# Patient Record
Sex: MALE | Race: ASIAN | NOT HISPANIC OR LATINO
[De-identification: names, ages, dates, MRNs, and addresses within clinical notes are randomized per-mention and may not be internally consistent; named-entity substitution may affect disease eponyms.]

---

## 2019-03-25 ENCOUNTER — NON-APPOINTMENT (OUTPATIENT)
Age: 34
End: 2019-03-25

## 2019-03-25 ENCOUNTER — APPOINTMENT (OUTPATIENT)
Dept: CARDIOLOGY | Facility: CLINIC | Age: 34
End: 2019-03-25
Payer: MEDICAID

## 2019-03-25 VITALS
TEMPERATURE: 98 F | DIASTOLIC BLOOD PRESSURE: 72 MMHG | RESPIRATION RATE: 17 BRPM | HEART RATE: 66 BPM | SYSTOLIC BLOOD PRESSURE: 110 MMHG | WEIGHT: 181 LBS | HEIGHT: 69 IN | OXYGEN SATURATION: 97 % | BODY MASS INDEX: 26.81 KG/M2

## 2019-03-25 PROCEDURE — 93000 ELECTROCARDIOGRAM COMPLETE: CPT

## 2019-03-25 PROCEDURE — 99214 OFFICE O/P EST MOD 30 MIN: CPT

## 2019-04-04 ENCOUNTER — MESSAGE (OUTPATIENT)
Age: 34
End: 2019-04-04

## 2020-11-17 ENCOUNTER — APPOINTMENT (OUTPATIENT)
Dept: UROLOGY | Facility: CLINIC | Age: 35
End: 2020-11-17
Payer: MEDICAID

## 2020-11-17 VITALS
OXYGEN SATURATION: 95 % | RESPIRATION RATE: 17 BRPM | DIASTOLIC BLOOD PRESSURE: 67 MMHG | BODY MASS INDEX: 27.55 KG/M2 | HEIGHT: 69 IN | TEMPERATURE: 98 F | WEIGHT: 186 LBS | HEART RATE: 100 BPM | SYSTOLIC BLOOD PRESSURE: 115 MMHG

## 2020-11-17 DIAGNOSIS — R94.31 ABNORMAL ELECTROCARDIOGRAM [ECG] [EKG]: ICD-10-CM

## 2020-11-17 DIAGNOSIS — R07.9 CHEST PAIN, UNSPECIFIED: ICD-10-CM

## 2020-11-17 DIAGNOSIS — N47.5 ADHESIONS OF PREPUCE AND GLANS PENIS: ICD-10-CM

## 2020-11-17 PROCEDURE — 99204 OFFICE O/P NEW MOD 45 MIN: CPT

## 2020-11-17 NOTE — LETTER BODY
[FreeTextEntry1] : Chris Hall MD\par [address], [address]\[place] [address]\par [phone]\par \par Dear Dr. Hall,\par \par Reason for visit: Redundant prepuce. Premature ejaculation.\par \par This is a 35 year-old gentleman with history of chest pain, presenting with redundant prepuce. Patient is here for evaluation for circumcision. He recently saw Dr. Chaz Echeverria, who prescribed him lidocaine cream and a topical steroid for his foreskin. Patient has difficulty maintaining hygiene. He is unable to retract his foreskin. He has balanitis. The patient experiences pain during sexual intercourse. He denies any hematuria or urinary incontinence. He also reports premature ejaculation. All other review of systems are negative. He has no cancer in his family medical history. He has no previous surgical history. Past medical history, family history and social history were inquired and were noncontributory to current condition. The patient does not use tobacco or drink alcohol. Medications and allergies were reviewed. He has no known allergies to medication. \par \par On examination, the patient is a healthy-appearing gentleman in no acute distress. He is alert and oriented follows commands. He has normal mood and affect. He is normocephalic. Neck is supple. Oral no thrush. Respirations are unlabored. His abdomen is soft and nontender. Bladder is nonpalpable. No CVA tenderness. Neurologically he is grossly intact. No peripheral edema. Skin without gross abnormality. He has normal male external genitalia. Normal meatus. Bilateral testes are descended intrascrotally and normal to palpation. On rectal examination, there is normal sphincter tone. The prostate is clinically benign without focal induration or nodularity. His foreskin is not retractable. He has balanitis.\par \par Assessment: Redundant prepuce. Premature ejaculation. Balanitis.\par \par I counseled the patient. I discussed the options available to him. I discussed the risks and benefits of circumcision. I counseled the patient regarding the procedure. The risks and benefits were discussed. Alternatives were given. I answered the patient questions. I recommended the patient consider undergoing circumcision if he experiences no improvement in his condition with the lidocaine cream and topical steroid prescribed by Dr. Echeverria. He will obtain activated partial thromboplastin time, BMP, CBC w/ DIFF, culture, prothrombin Time and INR today. Risks and alternatives were discussed. I answered the patient questions. The patient will follow-up as directed and will contact me with any questions or concerns. Thank you for the opportunity to participate in the care of Mr. HIDALGO. I will keep you updated on his progress.\par \par Plan: Pre-operative labs. Consider circumcision. Follow-up as directed.

## 2020-11-17 NOTE — ADDENDUM
[FreeTextEntry1] : Entered by Faisal Maldonado, acting as scribe for Dr. Urban Fishman.\par \par The documentation recorded by the scribe accurately reflects the service I personally performed and the decisions made by me.\par

## 2023-12-04 ENCOUNTER — APPOINTMENT (OUTPATIENT)
Dept: UROLOGY | Facility: CLINIC | Age: 38
End: 2023-12-04
Payer: MEDICAID

## 2023-12-04 VITALS
RESPIRATION RATE: 17 BRPM | HEART RATE: 90 BPM | OXYGEN SATURATION: 98 % | SYSTOLIC BLOOD PRESSURE: 130 MMHG | WEIGHT: 178 LBS | BODY MASS INDEX: 26.36 KG/M2 | DIASTOLIC BLOOD PRESSURE: 81 MMHG | TEMPERATURE: 97.7 F | HEIGHT: 69 IN

## 2023-12-04 DIAGNOSIS — Z86.79 PERSONAL HISTORY OF OTHER DISEASES OF THE CIRCULATORY SYSTEM: ICD-10-CM

## 2023-12-04 DIAGNOSIS — N47.8 OTHER DISORDERS OF PREPUCE: ICD-10-CM

## 2023-12-04 DIAGNOSIS — N48.1 BALANITIS: ICD-10-CM

## 2023-12-04 DIAGNOSIS — F52.4 PREMATURE EJACULATION: ICD-10-CM

## 2023-12-04 DIAGNOSIS — R80.8 OTHER PROTEINURIA: ICD-10-CM

## 2023-12-04 PROCEDURE — 99204 OFFICE O/P NEW MOD 45 MIN: CPT

## 2023-12-04 RX ORDER — CLOTRIMAZOLE AND BETAMETHASONE DIPROPIONATE 10; .5 MG/G; MG/G
1-0.05 CREAM TOPICAL TWICE DAILY
Qty: 1 | Refills: 0 | Status: ACTIVE | COMMUNITY
Start: 2023-12-04 | End: 1900-01-01

## 2023-12-04 RX ORDER — LOSARTAN POTASSIUM 100 MG/1
TABLET, FILM COATED ORAL
Refills: 0 | Status: ACTIVE | COMMUNITY

## 2023-12-04 RX ORDER — PAROXETINE HYDROCHLORIDE 10 MG/1
10 TABLET, FILM COATED ORAL
Qty: 30 | Refills: 1 | Status: ACTIVE | COMMUNITY
Start: 2023-12-04 | End: 1900-01-01

## 2023-12-04 RX ORDER — LIDOCAINE AND PRILOCAINE 25; 25 MG/G; MG/G
2.5-2.5 CREAM TOPICAL
Qty: 45 | Refills: 0 | Status: ACTIVE | COMMUNITY
Start: 2023-12-04 | End: 1900-01-01

## 2024-02-05 ENCOUNTER — APPOINTMENT (OUTPATIENT)
Dept: UROLOGY | Facility: CLINIC | Age: 39
End: 2024-02-05

## 2024-09-09 ENCOUNTER — APPOINTMENT (OUTPATIENT)
Dept: UROLOGY | Facility: CLINIC | Age: 39
End: 2024-09-09
Payer: MEDICAID

## 2024-09-09 VITALS
RESPIRATION RATE: 17 BRPM | WEIGHT: 170 LBS | HEART RATE: 95 BPM | TEMPERATURE: 97.5 F | DIASTOLIC BLOOD PRESSURE: 74 MMHG | SYSTOLIC BLOOD PRESSURE: 115 MMHG | OXYGEN SATURATION: 98 % | BODY MASS INDEX: 25.1 KG/M2

## 2024-09-09 DIAGNOSIS — N40.1 BENIGN PROSTATIC HYPERPLASIA WITH LOWER URINARY TRACT SYMPMS: ICD-10-CM

## 2024-09-09 DIAGNOSIS — N39.43 BENIGN PROSTATIC HYPERPLASIA WITH LOWER URINARY TRACT SYMPMS: ICD-10-CM

## 2024-09-09 PROCEDURE — 99213 OFFICE O/P EST LOW 20 MIN: CPT

## 2024-09-09 NOTE — HISTORY OF PRESENT ILLNESS
[FreeTextEntry1] : Patient is 38 years old with PMHx of HTN, proteinuria who presents for premature ejaculation and consultation for circumcision.  Prior hx: He was a pt of Dr Fishman in the past for balanitis and circumcision. He reports no recurrent foreskin pruritus/inflammation/pain. Is able to retract foreskin without difficulty. He reports that he is able to obtain erection, but ejaculates with less than one minute for years. He does noticed foreskin is tight to retract and some discomfort during erection. He does not take PDE inhibitors. He was seen by urologist Dr. Chaz Cornelius for premature ejaculation, discussed management option of antidepressive medication, but he did not try.  No history of DM. He does have insomnia and manage with Seroquel 12.5 mg currently.  No family history of  malignancy.  Never smoker.  9/9/24 Patient presents today for abnormal sonogram finding.  He was followed by nephrologist for proteinuria. Workup including renal sonogram noted enlarged prostate, 32 cc prostate gland.  Patient reports voiding well overall, some post void dribbling. Denies urinary frequency, urgency, hesitancy, straining to void. Feels emptying well.  Balanitis: he reports no recurrent pruritus/erythema.  in terms of premature ejaculation, he states he has no recent sexual activities, has not try lidocaine cream or paxil.

## 2024-09-09 NOTE — PHYSICAL EXAM
[General Appearance - Well Developed] : well developed [Normal Appearance] : normal appearance [General Appearance - In No Acute Distress] : no acute distress [] : no respiratory distress [Exaggerated Use Of Accessory Muscles For Inspiration] : no accessory muscle use [Urinary Bladder Findings] : the bladder was normal on palpation [Normal Station and Gait] : the gait and station were normal for the patient's age [No Focal Deficits] : no focal deficits [Oriented To Time, Place, And Person] : oriented to person, place, and time

## 2024-09-09 NOTE — ASSESSMENT
[FreeTextEntry1] : Patient is 38 years old with PMHx of HTN, proteinuria who presents for premature ejaculation and recent abnormal sonogram showing BPH of 32cc.  D/w pt that sonogram can be inaccurate and may be overestimating his prostate volume.  D/w pt that given he has minimal LUTs, only occ postvoid dribbling, would observe. Will obtain PSA  Encouraged pt to try numbing agents or SSRIs for premature ejaculation as needed.  F/u 1 yr

## 2024-09-11 ENCOUNTER — APPOINTMENT (OUTPATIENT)
Age: 39
End: 2024-09-11

## 2024-09-12 LAB — PSA SERPL-MCNC: 1.8 NG/ML
